# Patient Record
Sex: MALE | Race: WHITE | Employment: FULL TIME | URBAN - METROPOLITAN AREA
[De-identification: names, ages, dates, MRNs, and addresses within clinical notes are randomized per-mention and may not be internally consistent; named-entity substitution may affect disease eponyms.]

---

## 2018-09-13 ENCOUNTER — HOSPITAL ENCOUNTER (EMERGENCY)
Age: 53
Discharge: HOME | End: 2018-09-13
Attending: EMERGENCY MEDICINE
Payer: COMMERCIAL

## 2018-09-13 VITALS
HEIGHT: 72 IN | RESPIRATION RATE: 12 BRPM | WEIGHT: 205 LBS | SYSTOLIC BLOOD PRESSURE: 124 MMHG | BODY MASS INDEX: 27.77 KG/M2 | DIASTOLIC BLOOD PRESSURE: 86 MMHG | HEART RATE: 67 BPM | TEMPERATURE: 97.7 F | OXYGEN SATURATION: 97 %

## 2018-09-13 DIAGNOSIS — T15.91XA FOREIGN BODY OF RIGHT EYE, INITIAL ENCOUNTER: Primary | ICD-10-CM

## 2018-09-13 PROCEDURE — 99283 EMERGENCY DEPT VISIT LOW MDM: CPT

## 2018-09-13 PROCEDURE — 6370000000 HC RX 637 (ALT 250 FOR IP): Performed by: EMERGENCY MEDICINE

## 2018-09-13 RX ORDER — SULFACETAMIDE SODIUM 100 MG/ML
2 SOLUTION/ DROPS OPHTHALMIC ONCE
Status: COMPLETED | OUTPATIENT
Start: 2018-09-13 | End: 2018-09-13

## 2018-09-13 RX ORDER — M-VIT,TX,IRON,MINS/CALC/FOLIC 27MG-0.4MG
1 TABLET ORAL DAILY
COMMUNITY

## 2018-09-13 RX ORDER — TETRACAINE HYDROCHLORIDE 5 MG/ML
SOLUTION OPHTHALMIC
Status: DISCONTINUED
Start: 2018-09-13 | End: 2018-09-13 | Stop reason: HOSPADM

## 2018-09-13 RX ORDER — CHLORAL HYDRATE 500 MG
3000 CAPSULE ORAL 3 TIMES DAILY
COMMUNITY

## 2018-09-13 RX ORDER — TETRACAINE HYDROCHLORIDE 5 MG/ML
2 SOLUTION OPHTHALMIC ONCE
Status: COMPLETED | OUTPATIENT
Start: 2018-09-13 | End: 2018-09-13

## 2018-09-13 RX ADMIN — SULFACETAMIDE SODIUM 2 DROP: 100 SOLUTION/ DROPS OPHTHALMIC at 14:58

## 2018-09-13 RX ADMIN — TETRACAINE HYDROCHLORIDE 2 DROP: 5 SOLUTION OPHTHALMIC at 14:57

## 2018-09-13 ASSESSMENT — PAIN DESCRIPTION - LOCATION: LOCATION: EYE

## 2018-09-13 ASSESSMENT — PAIN DESCRIPTION - FREQUENCY: FREQUENCY: INTERMITTENT

## 2018-09-13 ASSESSMENT — PAIN DESCRIPTION - PAIN TYPE: TYPE: ACUTE PAIN

## 2018-09-13 ASSESSMENT — PAIN DESCRIPTION - ORIENTATION: ORIENTATION: RIGHT

## 2018-09-13 ASSESSMENT — VISUAL ACUITY
OD: 20/25
OU: 20/20
OS: 20/20

## 2018-09-13 ASSESSMENT — PAIN DESCRIPTION - DESCRIPTORS: DESCRIPTORS: BURNING;SHARP

## 2018-09-13 ASSESSMENT — PAIN SCALES - GENERAL: PAINLEVEL_OUTOF10: 3

## 2018-09-13 NOTE — ED TRIAGE NOTES
Pt ambulates to room 7 per self, this visit is a workers compt injury, pt states \"he was sweeping up things at work and the floor shavings flew up in his right eye\", pt presents with pain and redness to right eye

## 2018-09-13 NOTE — ED PROVIDER NOTES
removed with lid eversion. Anterior chambers clear. Pupils are equal and reactive to light. Fundi are benign bilaterally. The other eye demonstrates no ocular abnormalities. DIFFERENTIAL DIAGNOSIS/ MEDICAL DECISION MAKING:     Bleph-10 drops and follow up with optometrist as advised    Follow Exit Care instructions closely. I have reviewed the disposition diagnosis with the patient and or their family/guardian. I have answered their questions and given discharge instructions. They voiced understanding of these instructions and did not have any further questions or complaints. DIAGNOSTIC RESULTS     RADIOLOGY:   Non-plain film images such as CT, Ultrasound and MRI are read by the radiologist. Plain radiographic images are visualized and preliminarily interpreted by the emergency physician with the below findings:  No orders to display         LABS:  No results found for this visit on 09/13/18. ABNORMAL LABS:  Labs Reviewed - No data to display           EMERGENCY DEPARTMENT COURSE:   Vitals:    Vitals:    09/13/18 1432   BP: 124/86   Pulse: 67   Resp: 12   Temp: 97.7 °F (36.5 °C)   SpO2: 97%   Weight: 93 kg (205 lb)   Height: 6' (1.829 m)     -------------------------  BP: 124/86, Temp: 97.7 °F (36.5 °C), Pulse: 67, Resp: 12    See DDX/MDM  (Differential Diagnosis/Medical Decision Making) above. FINAL IMPRESSION      1.  Foreign body of right eye, initial encounter          DISPOSITION/PLAN   DISPOSITION Decision To Discharge 09/13/2018 02:49:15 PM      Condition on Disposition    Fair    PATIENT REFERRED TO:  Adri Barrera  [address] [place] [address]  [place]    In 1 day        DISCHARGE MEDICATIONS:  New Prescriptions    No medications on file       (Please note that portions of this note were completed with a voice recognition program.  Efforts were made to edit the dictations but occasionally words are mis-transcribed.)    Eunice Just Fought,, DO  Attending Emergency Physician

## 2023-10-24 ENCOUNTER — HOSPITAL ENCOUNTER (OUTPATIENT)
Dept: SLEEP CENTER | Age: 58
Discharge: HOME | End: 2023-10-26
Payer: COMMERCIAL

## 2023-10-24 PROCEDURE — G0399 HOME SLEEP TEST/TYPE 3 PORTA: HCPCS

## 2023-10-31 LAB — STATUS: NORMAL
